# Patient Record
Sex: FEMALE
[De-identification: names, ages, dates, MRNs, and addresses within clinical notes are randomized per-mention and may not be internally consistent; named-entity substitution may affect disease eponyms.]

---

## 2019-04-16 ENCOUNTER — APPOINTMENT (OUTPATIENT)
Dept: PULMONOLOGY | Facility: CLINIC | Age: 59
End: 2019-04-16

## 2019-04-16 PROBLEM — Z00.00 ENCOUNTER FOR PREVENTIVE HEALTH EXAMINATION: Status: ACTIVE | Noted: 2019-04-16

## 2021-09-28 ENCOUNTER — APPOINTMENT (OUTPATIENT)
Dept: SURGERY | Facility: CLINIC | Age: 61
End: 2021-09-28
Payer: COMMERCIAL

## 2021-09-28 VITALS — BODY MASS INDEX: 31.37 KG/M2 | WEIGHT: 186 LBS | HEIGHT: 64.5 IN

## 2021-09-28 DIAGNOSIS — Z87.39 PERSONAL HISTORY OF OTHER DISEASES OF THE MUSCULOSKELETAL SYSTEM AND CONNECTIVE TISSUE: ICD-10-CM

## 2021-09-28 DIAGNOSIS — Z80.1 FAMILY HISTORY OF MALIGNANT NEOPLASM OF TRACHEA, BRONCHUS AND LUNG: ICD-10-CM

## 2021-09-28 DIAGNOSIS — Z78.9 OTHER SPECIFIED HEALTH STATUS: ICD-10-CM

## 2021-09-28 DIAGNOSIS — Z86.2 PERSONAL HISTORY OF DISEASES OF THE BLOOD AND BLOOD-FORMING ORGANS AND CERTAIN DISORDERS INVOLVING THE IMMUNE MECHANISM: ICD-10-CM

## 2021-09-28 DIAGNOSIS — Q38.3 OTHER CONGENITAL MALFORMATIONS OF TONGUE: ICD-10-CM

## 2021-09-28 DIAGNOSIS — Z78.0 ASYMPTOMATIC MENOPAUSAL STATE: ICD-10-CM

## 2021-09-28 DIAGNOSIS — E04.1 NONTOXIC SINGLE THYROID NODULE: ICD-10-CM

## 2021-09-28 PROCEDURE — 99204 OFFICE O/P NEW MOD 45 MIN: CPT

## 2021-09-28 PROCEDURE — 36415 COLL VENOUS BLD VENIPUNCTURE: CPT

## 2021-09-28 RX ORDER — OMEPRAZOLE 40 MG/1
40 CAPSULE, DELAYED RELEASE ORAL
Refills: 0 | Status: ACTIVE | COMMUNITY

## 2021-09-28 RX ORDER — HYDROXYCHLOROQUINE SULFATE 200 MG/1
TABLET ORAL
Refills: 0 | Status: ACTIVE | COMMUNITY

## 2021-09-29 PROBLEM — Z78.0 HISTORY OF MENOPAUSE: Status: RESOLVED | Noted: 2021-09-29 | Resolved: 2021-09-29

## 2021-09-29 PROBLEM — Z86.2 HISTORY OF ANEMIA: Status: RESOLVED | Noted: 2021-09-29 | Resolved: 2021-09-29

## 2021-09-29 LAB
24R-OH-CALCIDIOL SERPL-MCNC: 65 PG/ML
CALCIUM SERPL-MCNC: 9.7 MG/DL
CALCIUM SERPL-MCNC: 9.7 MG/DL
PARATHYROID HORMONE INTACT: 59 PG/ML
T3 SERPL-MCNC: 86 NG/DL
T4 FREE SERPL-MCNC: 1.3 NG/DL
THYROGLOB AB SERPL-ACNC: <20 IU/ML
THYROPEROXIDASE AB SERPL IA-ACNC: <10 IU/ML
TSH SERPL-ACNC: 1.3 UIU/ML

## 2021-09-29 NOTE — REASON FOR VISIT
[Initial Consultation] : an initial consultation for [FreeTextEntry2] : Thyroid nodule and tongue swelling [Other: _____] : [unfilled]

## 2021-09-29 NOTE — ASSESSMENT
[FreeTextEntry1] : will observe thyroid nodule. discussed that size of nodule is below the threshold for which fine needle aspiration cytology is generally recommended in the absence of any suspicious sonographic or clinical findings.  bloods drawn. to call next week for results. sonogram next visit. to return earlier if any change. patient has been given the opportunity to ask questions, and all of the patient's questions have been answered to their satisfaction. not certain of cause of tongue swelling.  to f/u with allergist

## 2021-09-29 NOTE — PHYSICAL EXAM
[de-identified] : no palpable thyroid nodules [Laryngoscopy Performed] : laryngoscopy was performed, see procedure section for findings [Midline] : located in midline position [Normal] : orientation to person, place, and time: normal [de-identified] : indirect  laryngoscopy shows normal vocal cord mobility bilaterally with no lesions noted

## 2021-09-29 NOTE — CONSULT LETTER
[Dear  ___] : Dear  [unfilled], [Consult Letter:] : I had the pleasure of evaluating your patient, [unfilled]. [Please see my note below.] : Please see my note below. [Consult Closing:] : Thank you very much for allowing me to participate in the care of this patient.  If you have any questions, please do not hesitate to contact me. [Sincerely,] : Sincerely, [FreeTextEntry2] : Dr. Luisito Luke [FreeTextEntry3] : Esteban Mcintyre MD, FACS\par System Director, Endocrine Surgery\par [facility]\par Assistant Clinical Professor of Surgery\par [facility] [employer] [facility]

## 2021-09-29 NOTE — HISTORY OF PRESENT ILLNESS
[de-identified] : Pt c/o 2 episodes of tongue swelling.  has been seen by allergist  and is only allergic to dust. Pt has thyroid nodule found on sonogram.  denies dysphagia, hoarseness, SOB or RT exposure. \par sonogram:  left thyroid nodule 8 mm\par I have reviewed all old and new data and available images.\par

## 2021-10-01 ENCOUNTER — NON-APPOINTMENT (OUTPATIENT)
Age: 61
End: 2021-10-01

## 2022-03-21 ENCOUNTER — NON-APPOINTMENT (OUTPATIENT)
Age: 62
End: 2022-03-21

## 2022-03-29 ENCOUNTER — APPOINTMENT (OUTPATIENT)
Dept: SURGERY | Facility: CLINIC | Age: 62
End: 2022-03-29

## 2022-04-06 ENCOUNTER — NON-APPOINTMENT (OUTPATIENT)
Age: 62
End: 2022-04-06

## 2022-10-25 ENCOUNTER — APPOINTMENT (OUTPATIENT)
Dept: UROLOGY | Facility: CLINIC | Age: 62
End: 2022-10-25

## 2022-10-25 VITALS
SYSTOLIC BLOOD PRESSURE: 150 MMHG | WEIGHT: 184 LBS | DIASTOLIC BLOOD PRESSURE: 90 MMHG | TEMPERATURE: 96.6 F | HEIGHT: 64 IN | BODY MASS INDEX: 31.41 KG/M2 | HEART RATE: 100 BPM

## 2022-10-25 DIAGNOSIS — N39.0 URINARY TRACT INFECTION, SITE NOT SPECIFIED: ICD-10-CM

## 2022-10-25 PROCEDURE — 99204 OFFICE O/P NEW MOD 45 MIN: CPT

## 2022-10-25 RX ORDER — MUPIROCIN 20 MG/G
2 OINTMENT TOPICAL
Qty: 22 | Refills: 0 | Status: ACTIVE | COMMUNITY
Start: 2022-10-21

## 2022-10-25 RX ORDER — OLOPATADINE HCL 1 MG/ML
0.1 SOLUTION/ DROPS OPHTHALMIC
Qty: 5 | Refills: 0 | Status: ACTIVE | COMMUNITY
Start: 2022-06-24

## 2022-10-25 RX ORDER — KETOROLAC TROMETHAMINE 5 MG/ML
0.5 SOLUTION OPHTHALMIC
Qty: 5 | Refills: 0 | Status: ACTIVE | COMMUNITY
Start: 2022-05-11

## 2022-10-25 RX ORDER — PREDNISOLONE ACETATE 10 MG/ML
1 SUSPENSION/ DROPS OPHTHALMIC
Qty: 5 | Refills: 0 | Status: ACTIVE | COMMUNITY
Start: 2022-05-11

## 2022-10-25 RX ORDER — CIPROFLOXACIN HYDROCHLORIDE 500 MG/1
500 TABLET, FILM COATED ORAL
Qty: 14 | Refills: 0 | Status: ACTIVE | COMMUNITY
Start: 2022-08-27

## 2022-10-25 RX ORDER — COVID-19 ANTIGEN TEST
KIT MISCELLANEOUS
Qty: 8 | Refills: 0 | Status: ACTIVE | COMMUNITY
Start: 2022-09-28

## 2022-10-25 RX ORDER — LOSARTAN POTASSIUM AND HYDROCHLOROTHIAZIDE 12.5; 1 MG/1; MG/1
100-12.5 TABLET ORAL
Qty: 30 | Refills: 0 | Status: ACTIVE | COMMUNITY
Start: 2022-09-09

## 2022-10-25 RX ORDER — SULFAMETHOXAZOLE AND TRIMETHOPRIM 800; 160 MG/1; MG/1
800-160 TABLET ORAL
Qty: 14 | Refills: 0 | Status: ACTIVE | COMMUNITY
Start: 2022-09-28

## 2022-10-25 RX ORDER — OFLOXACIN 3 MG/ML
0.3 SOLUTION/ DROPS OPHTHALMIC
Qty: 5 | Refills: 0 | Status: ACTIVE | COMMUNITY
Start: 2022-06-24

## 2022-10-25 RX ORDER — UPADACITINIB 15 MG/1
15 TABLET, EXTENDED RELEASE ORAL
Qty: 30 | Refills: 0 | Status: ACTIVE | COMMUNITY
Start: 2022-09-23

## 2022-10-25 RX ORDER — CEPHALEXIN 500 MG/1
500 CAPSULE ORAL
Qty: 20 | Refills: 0 | Status: ACTIVE | COMMUNITY
Start: 2022-10-21

## 2022-10-25 NOTE — HISTORY OF PRESENT ILLNESS
[None] : None [FreeTextEntry1] : Ms. Chowdary is a very pleasant 62 year old woman here today for recurrent UTIs.\par She reports that since this August she had a consistent UTI that did not resolve until 2 weeks ago.\par Reports that she had been ciprofloxacin, Keflex, and most recently Bactrim which she reports helped.\par Prior to this episode in August she only had one UTI and that was treated successfully with ciprofloxacin last year.\par Recent renal US from this month negative.\par Drinks 8 glasses of water daily.\par Denies any current hematuria, dysuria or urinary retention.\par Denies any personal or family history of kidney stones.\par Denies any family history of urological cancers.\par Reports family history of DM, but does not have it herself.\par Has a history of RA on Rinvoq.\par Former smoker, quit in 1988.\par \par \par

## 2022-10-25 NOTE — ASSESSMENT
[FreeTextEntry1] : Ms. Chowdary is a very pleasant 62 year old woman here today for recurrent UTIs.\par Has a history of RA on Rinvoq.\par Former smoker, quit in 1988.\par \par We discussed potential etiologies of recurrent urinary tract infections in postmenopausal women \par UC.\par UA.\par Renal and bladder ultrasound demonstrates no kidney stones no hydronephrosis\par Continue water intake.\par Start Uquora.\par Discussed risks and benefits of Uqora\par We discussed general preventative strategies for urinary tract infections\par RTO in 2 months.

## 2022-10-28 LAB
APPEARANCE: CLEAR
BACTERIA UR CULT: NORMAL
BACTERIA: ABNORMAL
BILIRUBIN URINE: NEGATIVE
BLOOD URINE: NEGATIVE
COLOR: YELLOW
GLUCOSE QUALITATIVE U: NEGATIVE
HYALINE CASTS: 0 /LPF
KETONES URINE: NEGATIVE
LEUKOCYTE ESTERASE URINE: NEGATIVE
MICROSCOPIC-UA: NORMAL
NITRITE URINE: NEGATIVE
PH URINE: 5.5
PROTEIN URINE: NEGATIVE
RED BLOOD CELLS URINE: 1 /HPF
SPECIFIC GRAVITY URINE: 1.02
SQUAMOUS EPITHELIAL CELLS: 0 /HPF
UROBILINOGEN URINE: NORMAL
WHITE BLOOD CELLS URINE: 0 /HPF

## 2023-01-03 ENCOUNTER — APPOINTMENT (OUTPATIENT)
Dept: UROLOGY | Facility: CLINIC | Age: 63
End: 2023-01-03

## 2024-08-21 ENCOUNTER — APPOINTMENT (OUTPATIENT)
Dept: UROLOGY | Facility: CLINIC | Age: 64
End: 2024-08-21
Payer: MEDICARE

## 2024-08-21 VITALS
SYSTOLIC BLOOD PRESSURE: 137 MMHG | TEMPERATURE: 96.8 F | DIASTOLIC BLOOD PRESSURE: 83 MMHG | HEART RATE: 92 BPM | WEIGHT: 184 LBS | HEIGHT: 64 IN | BODY MASS INDEX: 31.41 KG/M2 | OXYGEN SATURATION: 98 %

## 2024-08-21 DIAGNOSIS — Z78.9 OTHER SPECIFIED HEALTH STATUS: ICD-10-CM

## 2024-08-21 DIAGNOSIS — N39.0 URINARY TRACT INFECTION, SITE NOT SPECIFIED: ICD-10-CM

## 2024-08-21 PROCEDURE — G2211 COMPLEX E/M VISIT ADD ON: CPT

## 2024-08-21 PROCEDURE — 99203 OFFICE O/P NEW LOW 30 MIN: CPT

## 2024-08-21 RX ORDER — NIFEDIPINE 20 MG/1
CAPSULE ORAL
Refills: 0 | Status: ACTIVE | COMMUNITY

## 2024-08-21 NOTE — HISTORY OF PRESENT ILLNESS
[None] : None [FreeTextEntry1] : Ms. Chowdary is a very pleasant 64 year old woman here today for history of recurrent UTI. She reports that she has been okay since she was here last. Reports that she had not had a UTI with Uquora for 2 years. She reports that she stopped taking it and recently had a UTI. Treated with antibiotic and restarted Uquora a few days ago. Denies any hematuria or dysuria. Reports urgency and frequency but not so bothersome.

## 2024-08-21 NOTE — ASSESSMENT
[FreeTextEntry1] : Ms. Chowdary is a very pleasant 64 year old woman here today for history of recurrent UTI. She reports that she has been okay since she was here last. Reports that she had not had a UTI with Uquora for 2 years. She reports that she stopped taking it and recently had a UTI. Treated with antibiotic and restarted Uquora a few days ago. Denies any hematuria or dysuria. Reports urgency and frequency but not so bothersome. UC. UA. RTO in 6 months.  Patient is being seen today for evaluation and management of a chronic and longitudinal ongoing condition and I am of the primary treating physician

## 2024-08-22 LAB
APPEARANCE: CLEAR
BACTERIA: NEGATIVE /HPF
BILIRUBIN URINE: NEGATIVE
BLOOD URINE: NEGATIVE
CAST: 0 /LPF
COLOR: YELLOW
EPITHELIAL CELLS: 2 /HPF
GLUCOSE QUALITATIVE U: NEGATIVE MG/DL
KETONES URINE: NEGATIVE MG/DL
LEUKOCYTE ESTERASE URINE: NEGATIVE
MICROSCOPIC-UA: NORMAL
NITRITE URINE: NEGATIVE
PH URINE: 5.5
PROTEIN URINE: NEGATIVE MG/DL
RED BLOOD CELLS URINE: 3 /HPF
SPECIFIC GRAVITY URINE: 1.03
UROBILINOGEN URINE: 0.2 MG/DL
WHITE BLOOD CELLS URINE: 1 /HPF

## 2024-08-23 ENCOUNTER — NON-APPOINTMENT (OUTPATIENT)
Age: 64
End: 2024-08-23

## 2024-08-23 LAB — BACTERIA UR CULT: NORMAL

## 2024-10-03 ENCOUNTER — TRANSCRIPTION ENCOUNTER (OUTPATIENT)
Age: 64
End: 2024-10-03

## 2024-10-04 ENCOUNTER — INPATIENT (INPATIENT)
Facility: HOSPITAL | Age: 64
LOS: 1 days | Discharge: HOME | End: 2024-10-06
Payer: MEDICARE

## 2024-10-04 VITALS
TEMPERATURE: 98 F | SYSTOLIC BLOOD PRESSURE: 166 MMHG | HEIGHT: 64 IN | WEIGHT: 190.04 LBS | DIASTOLIC BLOOD PRESSURE: 86 MMHG | HEART RATE: 94 BPM | OXYGEN SATURATION: 98 % | RESPIRATION RATE: 18 BRPM

## 2024-10-04 DIAGNOSIS — K35.80 UNSPECIFIED ACUTE APPENDICITIS: ICD-10-CM

## 2024-10-04 LAB
ALBUMIN SERPL ELPH-MCNC: 4.3 G/DL — SIGNIFICANT CHANGE UP (ref 3.3–5)
ALP SERPL-CCNC: 70 U/L — SIGNIFICANT CHANGE UP (ref 40–120)
ALT FLD-CCNC: 16 U/L — SIGNIFICANT CHANGE UP (ref 4–33)
ANION GAP SERPL CALC-SCNC: 13 MMOL/L — SIGNIFICANT CHANGE UP (ref 7–14)
ANISOCYTOSIS BLD QL: SLIGHT — SIGNIFICANT CHANGE UP
APPEARANCE UR: CLEAR — SIGNIFICANT CHANGE UP
APTT BLD: 32.8 SEC — SIGNIFICANT CHANGE UP (ref 24.5–35.6)
AST SERPL-CCNC: 15 U/L — SIGNIFICANT CHANGE UP (ref 4–32)
BACTERIA # UR AUTO: NEGATIVE /HPF — SIGNIFICANT CHANGE UP
BASOPHILS # BLD AUTO: 0 K/UL — SIGNIFICANT CHANGE UP (ref 0–0.2)
BASOPHILS NFR BLD AUTO: 0 % — SIGNIFICANT CHANGE UP (ref 0–2)
BILIRUB SERPL-MCNC: 0.6 MG/DL — SIGNIFICANT CHANGE UP (ref 0.2–1.2)
BILIRUB UR-MCNC: NEGATIVE — SIGNIFICANT CHANGE UP
BLD GP AB SCN SERPL QL: NEGATIVE — SIGNIFICANT CHANGE UP
BUN SERPL-MCNC: 11 MG/DL — SIGNIFICANT CHANGE UP (ref 7–23)
CALCIUM SERPL-MCNC: 9.4 MG/DL — SIGNIFICANT CHANGE UP (ref 8.4–10.5)
CAST: 0 /LPF — SIGNIFICANT CHANGE UP (ref 0–4)
CHLORIDE SERPL-SCNC: 98 MMOL/L — SIGNIFICANT CHANGE UP (ref 98–107)
CO2 SERPL-SCNC: 25 MMOL/L — SIGNIFICANT CHANGE UP (ref 22–31)
COLOR SPEC: YELLOW — SIGNIFICANT CHANGE UP
CREAT SERPL-MCNC: 0.76 MG/DL — SIGNIFICANT CHANGE UP (ref 0.5–1.3)
DACRYOCYTES BLD QL SMEAR: SLIGHT — SIGNIFICANT CHANGE UP
DIFF PNL FLD: NEGATIVE — SIGNIFICANT CHANGE UP
EGFR: 87 ML/MIN/1.73M2 — SIGNIFICANT CHANGE UP
ELLIPTOCYTES BLD QL SMEAR: SLIGHT — SIGNIFICANT CHANGE UP
EOSINOPHIL # BLD AUTO: 0.21 K/UL — SIGNIFICANT CHANGE UP (ref 0–0.5)
EOSINOPHIL NFR BLD AUTO: 2.6 % — SIGNIFICANT CHANGE UP (ref 0–6)
GIANT PLATELETS BLD QL SMEAR: PRESENT — SIGNIFICANT CHANGE UP
GLUCOSE SERPL-MCNC: 99 MG/DL — SIGNIFICANT CHANGE UP (ref 70–99)
GLUCOSE UR QL: NEGATIVE MG/DL — SIGNIFICANT CHANGE UP
HCT VFR BLD CALC: 35.2 % — SIGNIFICANT CHANGE UP (ref 34.5–45)
HGB BLD-MCNC: 11.1 G/DL — LOW (ref 11.5–15.5)
HYPOCHROMIA BLD QL: SIGNIFICANT CHANGE UP
IANC: 5.72 K/UL — SIGNIFICANT CHANGE UP (ref 1.8–7.4)
INR BLD: 1 RATIO — SIGNIFICANT CHANGE UP (ref 0.85–1.16)
KETONES UR-MCNC: NEGATIVE MG/DL — SIGNIFICANT CHANGE UP
LEUKOCYTE ESTERASE UR-ACNC: ABNORMAL
LIDOCAIN IGE QN: 15 U/L — SIGNIFICANT CHANGE UP (ref 7–60)
LYMPHOCYTES # BLD AUTO: 1.35 K/UL — SIGNIFICANT CHANGE UP (ref 1–3.3)
LYMPHOCYTES # BLD AUTO: 16.5 % — SIGNIFICANT CHANGE UP (ref 13–44)
MANUAL SMEAR VERIFICATION: SIGNIFICANT CHANGE UP
MCHC RBC-ENTMCNC: 18.4 PG — LOW (ref 27–34)
MCHC RBC-ENTMCNC: 31.5 GM/DL — LOW (ref 32–36)
MCV RBC AUTO: 58.5 FL — LOW (ref 80–100)
MICROCYTES BLD QL: SIGNIFICANT CHANGE UP
MONOCYTES # BLD AUTO: 0.29 K/UL — SIGNIFICANT CHANGE UP (ref 0–0.9)
MONOCYTES NFR BLD AUTO: 3.5 % — SIGNIFICANT CHANGE UP (ref 2–14)
NEUTROPHILS # BLD AUTO: 5.55 K/UL — SIGNIFICANT CHANGE UP (ref 1.8–7.4)
NEUTROPHILS NFR BLD AUTO: 66.9 % — SIGNIFICANT CHANGE UP (ref 43–77)
NEUTS BAND # BLD: 0.9 % — SIGNIFICANT CHANGE UP (ref 0–6)
NITRITE UR-MCNC: NEGATIVE — SIGNIFICANT CHANGE UP
OVALOCYTES BLD QL SMEAR: SIGNIFICANT CHANGE UP
PH UR: 6.5 — SIGNIFICANT CHANGE UP (ref 5–8)
PLAT MORPH BLD: NORMAL — SIGNIFICANT CHANGE UP
PLATELET # BLD AUTO: 258 K/UL — SIGNIFICANT CHANGE UP (ref 150–400)
PLATELET COUNT - ESTIMATE: NORMAL — SIGNIFICANT CHANGE UP
POIKILOCYTOSIS BLD QL AUTO: SIGNIFICANT CHANGE UP
POTASSIUM SERPL-MCNC: 3.7 MMOL/L — SIGNIFICANT CHANGE UP (ref 3.5–5.3)
POTASSIUM SERPL-SCNC: 3.7 MMOL/L — SIGNIFICANT CHANGE UP (ref 3.5–5.3)
PROT SERPL-MCNC: 7.3 G/DL — SIGNIFICANT CHANGE UP (ref 6–8.3)
PROT UR-MCNC: SIGNIFICANT CHANGE UP MG/DL
PROTHROM AB SERPL-ACNC: 11.6 SEC — SIGNIFICANT CHANGE UP (ref 9.9–13.4)
RBC # BLD: 6.02 M/UL — HIGH (ref 3.8–5.2)
RBC # FLD: 17 % — HIGH (ref 10.3–14.5)
RBC BLD AUTO: ABNORMAL
RBC CASTS # UR COMP ASSIST: 1 /HPF — SIGNIFICANT CHANGE UP (ref 0–4)
RH IG SCN BLD-IMP: POSITIVE — SIGNIFICANT CHANGE UP
SCHISTOCYTES BLD QL AUTO: SLIGHT — SIGNIFICANT CHANGE UP
SMUDGE CELLS # BLD: PRESENT — SIGNIFICANT CHANGE UP
SODIUM SERPL-SCNC: 136 MMOL/L — SIGNIFICANT CHANGE UP (ref 135–145)
SP GR SPEC: 1.07 — HIGH (ref 1–1.03)
SQUAMOUS # UR AUTO: 3 /HPF — SIGNIFICANT CHANGE UP (ref 0–5)
TARGETS BLD QL SMEAR: SLIGHT — SIGNIFICANT CHANGE UP
UROBILINOGEN FLD QL: 0.2 MG/DL — SIGNIFICANT CHANGE UP (ref 0.2–1)
VARIANT LYMPHS # BLD: 9.6 % — HIGH (ref 0–6)
WBC # BLD: 8.18 K/UL — SIGNIFICANT CHANGE UP (ref 3.8–10.5)
WBC # FLD AUTO: 8.18 K/UL — SIGNIFICANT CHANGE UP (ref 3.8–10.5)
WBC UR QL: 8 /HPF — HIGH (ref 0–5)

## 2024-10-04 PROCEDURE — 99285 EMERGENCY DEPT VISIT HI MDM: CPT

## 2024-10-04 PROCEDURE — 71045 X-RAY EXAM CHEST 1 VIEW: CPT | Mod: 26

## 2024-10-04 DEVICE — STAPLER COVIDIEN TRI-STAPLE 45MM PURPLE RELOAD: Type: IMPLANTABLE DEVICE | Status: FUNCTIONAL

## 2024-10-04 RX ORDER — CLINDAMYCIN PHOSPHATE 150 MG/ML
600 INJECTION, SOLUTION INTRAVENOUS ONCE
Refills: 0 | Status: COMPLETED | OUTPATIENT
Start: 2024-10-04 | End: 2024-10-04

## 2024-10-04 RX ORDER — FENTANYL CITRATE-0.9 % NACL/PF 300MCG/30
50 PATIENT CONTROLLED ANALGESIA VIAL INJECTION
Refills: 0 | Status: DISCONTINUED | OUTPATIENT
Start: 2024-10-04 | End: 2024-10-04

## 2024-10-04 RX ORDER — HYDROMORPHONE HYDROCHLORIDE 1 MG/ML
0.5 INJECTION, SOLUTION INTRAMUSCULAR; INTRAVENOUS; SUBCUTANEOUS
Refills: 0 | Status: DISCONTINUED | OUTPATIENT
Start: 2024-10-04 | End: 2024-10-04

## 2024-10-04 RX ORDER — MORPHINE SULFATE 30 MG/1
2 TABLET, FILM COATED, EXTENDED RELEASE ORAL ONCE
Refills: 0 | Status: DISCONTINUED | OUTPATIENT
Start: 2024-10-04 | End: 2024-10-04

## 2024-10-04 RX ORDER — HYDROXYCHLOROQUINE SULFATE 200 MG/1
0 TABLET, FILM COATED ORAL
Refills: 0 | DISCHARGE

## 2024-10-04 RX ORDER — ANTACID TABLETS 500 MG/1
1 TABLET, CHEWABLE ORAL ONCE
Refills: 0 | Status: COMPLETED | OUTPATIENT
Start: 2024-10-04 | End: 2024-10-04

## 2024-10-04 RX ORDER — SODIUM CHLORIDE IRRIG SOLUTION 0.9 %
1000 SOLUTION, IRRIGATION IRRIGATION
Refills: 0 | Status: DISCONTINUED | OUTPATIENT
Start: 2024-10-04 | End: 2024-10-05

## 2024-10-04 RX ORDER — CLINDAMYCIN PHOSPHATE 150 MG/ML
600 INJECTION, SOLUTION INTRAVENOUS EVERY 8 HOURS
Refills: 0 | Status: DISCONTINUED | OUTPATIENT
Start: 2024-10-05 | End: 2024-10-05

## 2024-10-04 RX ORDER — SODIUM CHLORIDE 0.9 % (FLUSH) 0.9 %
1000 SYRINGE (ML) INJECTION ONCE
Refills: 0 | Status: COMPLETED | OUTPATIENT
Start: 2024-10-04 | End: 2024-10-04

## 2024-10-04 RX ORDER — CLINDAMYCIN PHOSPHATE 150 MG/ML
INJECTION, SOLUTION INTRAVENOUS
Refills: 0 | Status: DISCONTINUED | OUTPATIENT
Start: 2024-10-04 | End: 2024-10-05

## 2024-10-04 RX ORDER — ACETAMINOPHEN 325 MG
1000 TABLET ORAL EVERY 6 HOURS
Refills: 0 | Status: COMPLETED | OUTPATIENT
Start: 2024-10-04 | End: 2024-10-05

## 2024-10-04 RX ORDER — OXYCODONE HYDROCHLORIDE 30 MG/1
5 TABLET, FILM COATED, EXTENDED RELEASE ORAL ONCE
Refills: 0 | Status: DISCONTINUED | OUTPATIENT
Start: 2024-10-04 | End: 2024-10-04

## 2024-10-04 RX ORDER — LOSARTAN POTASSIUM 100 MG/1
1 TABLET, FILM COATED ORAL
Refills: 0 | DISCHARGE

## 2024-10-04 RX ORDER — DIPHENHYDRAMINE HCL 12.5MG/5ML
50 LIQUID (ML) ORAL ONCE
Refills: 0 | Status: DISCONTINUED | OUTPATIENT
Start: 2024-10-04 | End: 2024-10-06

## 2024-10-04 RX ORDER — ONDANSETRON HCL/PF 4 MG/2 ML
4 VIAL (ML) INJECTION ONCE
Refills: 0 | Status: COMPLETED | OUTPATIENT
Start: 2024-10-04 | End: 2024-10-04

## 2024-10-04 RX ADMIN — ANTACID TABLETS 1 TABLET(S): 500 TABLET, CHEWABLE ORAL at 21:50

## 2024-10-04 RX ADMIN — Medication 400 MILLIGRAM(S): at 23:42

## 2024-10-04 RX ADMIN — Medication 120 MILLILITER(S): at 18:57

## 2024-10-04 RX ADMIN — Medication 1000 MILLILITER(S): at 13:43

## 2024-10-04 RX ADMIN — HYDROMORPHONE HYDROCHLORIDE 0.5 MILLIGRAM(S): 1 INJECTION, SOLUTION INTRAMUSCULAR; INTRAVENOUS; SUBCUTANEOUS at 19:15

## 2024-10-04 RX ADMIN — CLINDAMYCIN PHOSPHATE 100 MILLIGRAM(S): 150 INJECTION, SOLUTION INTRAVENOUS at 19:44

## 2024-10-04 RX ADMIN — Medication 200 MILLIGRAM(S): at 12:40

## 2024-10-04 RX ADMIN — Medication 120 MILLILITER(S): at 21:53

## 2024-10-04 RX ADMIN — HYDROMORPHONE HYDROCHLORIDE 0.5 MILLIGRAM(S): 1 INJECTION, SOLUTION INTRAMUSCULAR; INTRAVENOUS; SUBCUTANEOUS at 18:11

## 2024-10-04 RX ADMIN — Medication 100 MILLIGRAM(S): at 14:25

## 2024-10-04 RX ADMIN — HYDROMORPHONE HYDROCHLORIDE 0.5 MILLIGRAM(S): 1 INJECTION, SOLUTION INTRAMUSCULAR; INTRAVENOUS; SUBCUTANEOUS at 19:00

## 2024-10-04 RX ADMIN — Medication 5000 UNIT(S): at 14:26

## 2024-10-04 RX ADMIN — Medication 4 MILLIGRAM(S): at 19:40

## 2024-10-04 RX ADMIN — MORPHINE SULFATE 2 MILLIGRAM(S): 30 TABLET, FILM COATED, EXTENDED RELEASE ORAL at 12:40

## 2024-10-04 RX ADMIN — HYDROMORPHONE HYDROCHLORIDE 0.5 MILLIGRAM(S): 1 INJECTION, SOLUTION INTRAMUSCULAR; INTRAVENOUS; SUBCUTANEOUS at 18:59

## 2024-10-04 RX ADMIN — Medication 5000 UNIT(S): at 21:48

## 2024-10-04 NOTE — ED PROVIDER NOTE - ATTENDING CONTRIBUTION TO CARE
64F complaining of abdominal pain.  Patient with gradually worsening mid abdominal pain and now complaining of worsened low abdominal pain to the right side.  Went to outpatient imaging and arrives with CT showing acute appendicitis.  Had no complaints of recent illness, no fever/chills, denies N/V, no sick contacts.  MMM, clear lungs, heart reg, abd soft, tender RLQ to palp, no gabrielle/ mild guarding, no CVAT, no edema, NT calves.

## 2024-10-04 NOTE — H&P ADULT - NSHPPHYSICALEXAM_GEN_ALL_CORE
General Appearance: no acute distress, NTND   Chest: airway intact, non-labored breathing  CV: no JVD, palpable pulses b/l  Abdomen: soft, focally TTP in RLQ, nondistended  Extremities: WWP

## 2024-10-04 NOTE — ED PROVIDER NOTE - CLINICAL SUMMARY MEDICAL DECISION MAKING FREE TEXT BOX
65 yo f hx HTN p/w appendicitis. had lower abdominal pain starting umbilicus radiating to RLQ went to PMD and got CT imaging this am, found to have appendicitis sent to ED. denies any fevers, chills, n/v, dysuria. on exam patient exquisitely tender RLQ w rebound, will upload CT imaging and consult surgery.

## 2024-10-04 NOTE — ED ADULT NURSE REASSESSMENT NOTE - NS ED NURSE REASSESS COMMENT FT1
Verbal report given to Fantasma from OR , pt undressed placed in the hospital gown , belongings given to  - awaiting transport

## 2024-10-04 NOTE — PRE-OP CHECKLIST - COMMENTS
took Protonix at 6am with sip of water and contrast at 6am took Protonix at 6am with sip of water and contrast at 8am

## 2024-10-04 NOTE — H&P ADULT - HISTORY OF PRESENT ILLNESS
64F PMHx RA (hydroxycholorquinolone), myomectomy (firborids), HTN presenting after OP CT scan this am showed c/f appendicitis. Patient reports pain started over the weekend as gas-like pain. It was mainly periumbilical but over the next few days migrated to RLQ. Patient denies subjective fever/chills, CP, SOB, or n/v. Pain currently  well controlled. Last colonoscopy was in March which she reports was normal.    In the ED AVSS and labs unremarkable. CT images from OP scan in process of being uploaded.

## 2024-10-04 NOTE — CHART NOTE - NSCHARTNOTEFT_GEN_A_CORE
SURGERY POST OP CHECK    STATUS POST PROCEDURE: Lap appendectomy    SUBJECTIVE: Pt seen and examined without complaints. Pain is controlled. Denies CP/SOB/N/V.       OBJECTIVE:  Vital Signs Last 24 Hrs  T(C): 36.7 (04 Oct 2024 20:40), Max: 36.8 (04 Oct 2024 11:04)  T(F): 98 (04 Oct 2024 20:40), Max: 98.3 (04 Oct 2024 11:04)  HR: 89 (04 Oct 2024 20:40) (73 - 95)  BP: 146/71 (04 Oct 2024 20:40) (99/87 - 166/86)  BP(mean): 78 (04 Oct 2024 20:00) (75 - 92)  RR: 18 (04 Oct 2024 20:40) (13 - 18)  SpO2: 99% (04 Oct 2024 20:40) (94% - 100%)    Parameters below as of 04 Oct 2024 20:40  Patient On (Oxygen Delivery Method): room air      I&O's Summary    04 Oct 2024 07:01  -  04 Oct 2024 22:49  --------------------------------------------------------  IN: 340 mL / OUT: 275 mL / NET: 65 mL        PHYSICAL EXAM:  Gen: NAD, A&Ox3  Pulm: No respiratory distress, no subcostal retractions  CV: RRR, no JVD  Abd: Soft, NT, ND, port sites c/d/i, drain site with gauze and tegaderm c/d/i  Drains: HUGO x 1 SS holding suction  Extremities: FROM, warm and well perfused, equal bilateral muscle strength    ASSESSMENT/PLAN: HPI:  64F PMHx RA (hydroxycholorquinolone), myomectomy (firborids), HTN presenting after OP CT scan this am showed c/f appendicitis. Patient reports pain started over the weekend as gas-like pain. It was mainly periumbilical but over the next few days migrated to RLQ. Patient denies subjective fever/chills, CP, SOB, or n/v. Pain currently  well controlled. Last colonoscopy was in March which she reports was normal.  In the ED AVSS and labs unremarkable. CT images from OP scan in process of being uploaded.   . Pt is s/p ; POD #0 Lap appendectomy. Appearing well overall. HDS.     Plan:  - Diet   - Monitor bowel function   - Antibiotics  - Analgesia and antiemetics as needed  - Strict I&O's  - Monitor HUGO drain output- HUGO drain teaching  - OOB as tolerated  - Incentive spirometry  - DVT prophylaxis: SCD  - Monitor overnight  - monitor abdominal exam  - monitor UOP

## 2024-10-04 NOTE — ED ADULT NURSE NOTE - OBJECTIVE STATEMENT
sent in for appendicitis as found on CT studies today. pt c/o LRQ abd pain since last week, denies fever, chills, reports was recently treated for UTI. Pt is visibly uncomfortable in triage.- as per Triage note, pt states the same . Pt seen and eval by provider ,IV established , blood work drawn and esnd it to the lab . Awaiting surgery to come

## 2024-10-04 NOTE — ED PROVIDER NOTE - PHYSICAL EXAMINATION
GENERAL: well appearing in no acute distress, non-toxic appearing  CARDIAC: regular rate and rhythm, normal S1S2, no appreciable murmurs, 2+ pulses in UE/LE b/l  PULM: normal breath sounds, clear to ascultation bilaterally, no rales, rhonchi, wheezing  GI: abdomen nondistended, soft, tender RLQ w rebound   : no CVA tenderness b/l, no suprapubic tenderness  SKIN: well-perfused, extremities warm, no visible rashes  PSYCH: appropriate mood and affect

## 2024-10-04 NOTE — H&P ADULT - ATTENDING COMMENTS
Acute appendicitis on outpatient CT  Several days of abdominal pain  Abd soft, RLQ and suprapubic tenderness  For OR today

## 2024-10-04 NOTE — ED ADULT TRIAGE NOTE - CHIEF COMPLAINT QUOTE
sent in for appendicitis as found on CT studies today. pt c/o LRQ abd pain since last week, denies fever, chills, reports was recently treated for UTI. Pt is visibly uncomfortable in triage.

## 2024-10-04 NOTE — ASU PATIENT PROFILE, ADULT - NS TRANSFER EYEGLASSES PAIRS

## 2024-10-04 NOTE — ED ADULT NURSE REASSESSMENT NOTE - NS ED NURSE REASSESS COMMENT FT1
This nurse was called to the bedside, pt states she  feels she  been having allergic reaction , currently patient receiving Cipro IV - medication stopped . MD called ot the bedisde for eval .Pt has been having R eye puffiness and redness, neck noted to be having some redness. Pt states she has been coughing since started antibiotic - med stopped untill further orders

## 2024-10-04 NOTE — H&P ADULT - ASSESSMENT
64F PMHx RA (hydroxycholorquinolone), myomectomy (firborids), HTN presenting after OP CT scan this am showed c/f appendicitis.    Plan  - NPO/IVF  - added-on and consented for lap appy (Dr. Fiore)  - IV abx  - IV pain meds  - DVT PPX  - F/u CT scans now uploaded  Discussed with Dr. Estefanía BOND Team [id number]  Mello Yousif MD (PGY2)

## 2024-10-04 NOTE — PATIENT PROFILE ADULT - PATIENT'S GENDER IDENTITY
MD Smith:  The scribe's documentation has been prepared under my direction and personally reviewed by me in its entirety. I confirm that the note above accurately reflects all work, treatment, procedures, and medical decision making performed by me.  MD Smith:  see the MDM & progress notes for my I&P. Female

## 2024-10-05 ENCOUNTER — TRANSCRIPTION ENCOUNTER (OUTPATIENT)
Age: 64
End: 2024-10-05

## 2024-10-05 PROCEDURE — 88304 TISSUE EXAM BY PATHOLOGIST: CPT | Mod: 26

## 2024-10-05 RX ORDER — ACETAMINOPHEN 325 MG
975 TABLET ORAL EVERY 6 HOURS
Refills: 0 | Status: DISCONTINUED | OUTPATIENT
Start: 2024-10-05 | End: 2024-10-06

## 2024-10-05 RX ORDER — OXYCODONE HYDROCHLORIDE 30 MG/1
1 TABLET, FILM COATED, EXTENDED RELEASE ORAL
Qty: 18 | Refills: 0
Start: 2024-10-05 | End: 2024-10-07

## 2024-10-05 RX ORDER — OXYCODONE HYDROCHLORIDE 30 MG/1
5 TABLET, FILM COATED, EXTENDED RELEASE ORAL EVERY 4 HOURS
Refills: 0 | Status: DISCONTINUED | OUTPATIENT
Start: 2024-10-05 | End: 2024-10-06

## 2024-10-05 RX ORDER — OXYCODONE HYDROCHLORIDE 30 MG/1
2.5 TABLET, FILM COATED, EXTENDED RELEASE ORAL EVERY 4 HOURS
Refills: 0 | Status: DISCONTINUED | OUTPATIENT
Start: 2024-10-05 | End: 2024-10-06

## 2024-10-05 RX ADMIN — Medication 400 MILLIGRAM(S): at 11:21

## 2024-10-05 RX ADMIN — OXYCODONE HYDROCHLORIDE 5 MILLIGRAM(S): 30 TABLET, FILM COATED, EXTENDED RELEASE ORAL at 22:47

## 2024-10-05 RX ADMIN — OXYCODONE HYDROCHLORIDE 5 MILLIGRAM(S): 30 TABLET, FILM COATED, EXTENDED RELEASE ORAL at 15:38

## 2024-10-05 RX ADMIN — Medication 400 MILLIGRAM(S): at 06:24

## 2024-10-05 RX ADMIN — CLINDAMYCIN PHOSPHATE 100 MILLIGRAM(S): 150 INJECTION, SOLUTION INTRAVENOUS at 13:54

## 2024-10-05 RX ADMIN — OXYCODONE HYDROCHLORIDE 5 MILLIGRAM(S): 30 TABLET, FILM COATED, EXTENDED RELEASE ORAL at 23:47

## 2024-10-05 RX ADMIN — Medication 500 MILLIGRAM(S): at 21:20

## 2024-10-05 RX ADMIN — Medication 80 MILLIGRAM(S): at 09:46

## 2024-10-05 RX ADMIN — Medication 100 MILLIGRAM(S): at 01:21

## 2024-10-05 RX ADMIN — Medication 5000 UNIT(S): at 13:54

## 2024-10-05 RX ADMIN — Medication 1000 MILLIGRAM(S): at 11:51

## 2024-10-05 RX ADMIN — Medication 5000 UNIT(S): at 21:20

## 2024-10-05 RX ADMIN — OXYCODONE HYDROCHLORIDE 5 MILLIGRAM(S): 30 TABLET, FILM COATED, EXTENDED RELEASE ORAL at 16:08

## 2024-10-05 RX ADMIN — Medication 975 MILLIGRAM(S): at 17:45

## 2024-10-05 RX ADMIN — Medication 975 MILLIGRAM(S): at 23:43

## 2024-10-05 RX ADMIN — Medication 975 MILLIGRAM(S): at 18:15

## 2024-10-05 RX ADMIN — CLINDAMYCIN PHOSPHATE 100 MILLIGRAM(S): 150 INJECTION, SOLUTION INTRAVENOUS at 06:28

## 2024-10-05 RX ADMIN — Medication 5000 UNIT(S): at 06:28

## 2024-10-05 RX ADMIN — Medication 80 MILLIGRAM(S): at 20:49

## 2024-10-05 RX ADMIN — Medication 100 MILLIGRAM(S): at 14:25

## 2024-10-05 NOTE — DISCHARGE NOTE PROVIDER - NSDCFUADDAPPT_GEN_ALL_CORE_FT
PLEASE CALL THE OFFICE ON MONDAY TO SCHEDULE THE FOLLOW UP APPOINTMENT EITHER TUESDAY OR THURSDAY DEPENDING ON THE DRAIN OUTPUT.

## 2024-10-05 NOTE — PROGRESS NOTE ADULT - SUBJECTIVE AND OBJECTIVE BOX
DATE OF SERVICE: 10-05-24 @ 12:09    INTERVAL HPI/OVERNIGHT EVENTS:  Doing well post op, voiding. tolerating cld so far    MEDICATIONS  (STANDING):  clindamycin IVPB 600 milliGRAM(s) IV Intermittent every 8 hours  clindamycin IVPB      diphenhydrAMINE Injectable 50 milliGRAM(s) IV Push once  heparin   Injectable 5000 Unit(s) SubCutaneous every 8 hours  metroNIDAZOLE  IVPB 500 milliGRAM(s) IV Intermittent every 12 hours    MEDICATIONS  (PRN):      Vital Signs Last 24 Hrs  T(C): 36.8 (05 Oct 2024 10:05), Max: 36.8 (04 Oct 2024 14:09)  T(F): 98.2 (05 Oct 2024 10:05), Max: 98.3 (04 Oct 2024 14:09)  HR: 82 (05 Oct 2024 10:05) (73 - 95)  BP: 145/78 (05 Oct 2024 10:05) (99/87 - 152/77)  BP(mean): 78 (04 Oct 2024 20:00) (75 - 92)  RR: 17 (05 Oct 2024 10:05) (13 - 20)  SpO2: 98% (05 Oct 2024 10:05) (94% - 100%)    Parameters below as of 05 Oct 2024 10:05  Patient On (Oxygen Delivery Method): room air      I&O's Detail    04 Oct 2024 07:01  -  05 Oct 2024 07:00  --------------------------------------------------------  IN:    Lactated Ringers: 840 mL    Oral Fluid: 220 mL  Total IN: 1060 mL    OUT:    Bulb (mL): 55 mL    Voided (mL): 1000 mL  Total OUT: 1055 mL    Total NET: 5 mL      05 Oct 2024 07:01  -  05 Oct 2024 12:09  --------------------------------------------------------  IN:  Total IN: 0 mL    OUT:    Bulb (mL): 70 mL    Voided (mL): 750 mL  Total OUT: 820 mL    Total NET: -820 mL            Physical Exam:  General: WN/WD NAD  Respiratory: airway patent, respirations unlabored, no increased WoB  Neurology: A&Ox3, nonfocal, BUSTOS x 4  Abdominal: soft, incisions c/d/i, appropriate ttp, HUGO SS      LABS:                        11.1   8.18  )-----------( 258      ( 04 Oct 2024 12:46 )             35.2     10    136  |  98  |  11  ----------------------------<  99  3.7   |  25  |  0.76    Ca    9.4      04 Oct 2024 12:46    TPro  7.3  /  Alb  4.3  /  TBili  0.6  /  DBili  x   /  AST  15  /  ALT  16  /  AlkPhos  70  10-04    PT/INR - ( 04 Oct 2024 12:46 )   PT: 11.6 sec;   INR: 1.00 ratio         PTT - ( 04 Oct 2024 12:46 )  PTT:32.8 sec  Urinalysis Basic - ( 04 Oct 2024 12:46 )    Color: Yellow / Appearance: Clear / S.070 / pH: x  Gluc: 99 mg/dL / Ketone: Negative mg/dL  / Bili: Negative / Urobili: 0.2 mg/dL   Blood: x / Protein: Trace mg/dL / Nitrite: Negative   Leuk Esterase: Trace / RBC: 1 /HPF / WBC 8 /HPF   Sq Epi: x / Non Sq Epi: 3 /HPF / Bacteria: Negative /HPF        RADIOLOGY & ADDITIONAL STUDIES: DATE OF SERVICE: 10-05-24 @ 12:09    INTERVAL HPI/OVERNIGHT EVENTS:  Doing well post op, voiding. tolerating cld so far    MEDICATIONS  (STANDING):  clindamycin IVPB 600 milliGRAM(s) IV Intermittent every 8 hours  clindamycin IVPB      diphenhydrAMINE Injectable 50 milliGRAM(s) IV Push once  heparin   Injectable 5000 Unit(s) SubCutaneous every 8 hours  metroNIDAZOLE  IVPB 500 milliGRAM(s) IV Intermittent every 12 hours    MEDICATIONS  (PRN):      Vital Signs Last 24 Hrs  T(C): 36.8 (05 Oct 2024 10:05), Max: 36.8 (04 Oct 2024 14:09)  T(F): 98.2 (05 Oct 2024 10:05), Max: 98.3 (04 Oct 2024 14:09)  HR: 82 (05 Oct 2024 10:05) (73 - 95)  BP: 145/78 (05 Oct 2024 10:05) (99/87 - 152/77)  BP(mean): 78 (04 Oct 2024 20:00) (75 - 92)  RR: 17 (05 Oct 2024 10:05) (13 - 20)  SpO2: 98% (05 Oct 2024 10:05) (94% - 100%)    Parameters below as of 05 Oct 2024 10:05  Patient On (Oxygen Delivery Method): room air      I&O's Detail    04 Oct 2024 07:01  -  05 Oct 2024 07:00  --------------------------------------------------------  IN:    Lactated Ringers: 840 mL    Oral Fluid: 220 mL  Total IN: 1060 mL    OUT:    Bulb (mL): 55 mL    Voided (mL): 1000 mL  Total OUT: 1055 mL    Total NET: 5 mL      05 Oct 2024 07:01  -  05 Oct 2024 12:09  --------------------------------------------------------  IN:  Total IN: 0 mL    OUT:    Bulb (mL): 70 mL    Voided (mL): 750 mL  Total OUT: 820 mL    Total NET: -820 mL            Physical Exam:  General: WN/WD NAD  Respiratory: airway patent, respirations unlabored, no increased WoB  Neurology: A&Ox3, nonfocal, BUSTOS x 4  Abdominal: Abd: Soft, NT, ND, port sites c/d/i, drain site with gauze and tegaderm c/d/i  Drains: HUGO x 1 SS holding suction      LABS:                        11.1   8.18  )-----------( 258      ( 04 Oct 2024 12:46 )             35.2     10    136  |  98  |  11  ----------------------------<  99  3.7   |  25  |  0.76    Ca    9.4      04 Oct 2024 12:46    TPro  7.3  /  Alb  4.3  /  TBili  0.6  /  DBili  x   /  AST  15  /  ALT  16  /  AlkPhos  70  10-04    PT/INR - ( 04 Oct 2024 12:46 )   PT: 11.6 sec;   INR: 1.00 ratio         PTT - ( 04 Oct 2024 12:46 )  PTT:32.8 sec  Urinalysis Basic - ( 04 Oct 2024 12:46 )    Color: Yellow / Appearance: Clear / S.070 / pH: x  Gluc: 99 mg/dL / Ketone: Negative mg/dL  / Bili: Negative / Urobili: 0.2 mg/dL   Blood: x / Protein: Trace mg/dL / Nitrite: Negative   Leuk Esterase: Trace / RBC: 1 /HPF / WBC 8 /HPF   Sq Epi: x / Non Sq Epi: 3 /HPF / Bacteria: Negative /HPF        RADIOLOGY & ADDITIONAL STUDIES:

## 2024-10-05 NOTE — DISCHARGE NOTE PROVIDER - CARE PROVIDER_API CALL
Stephanie Fiore  Surgery  [address] [place] [address]  Phone: [phone]  Fax: [phone]  Follow Up Time: 1 week

## 2024-10-05 NOTE — DISCHARGE NOTE PROVIDER - NSDCFUADDINST_GEN_ALL_CORE_FT
PAIN MEDICATION: Please take tylenol every 6 hours, and stagger with ibuprofen every 6 hours. This will allow you to alternate medications for more consistent pain control. For example: take a dose of tylenol at 8 am, then take a dose of ibuprofen at 11 am, then take a dose of tylenol at 2pm, and continue as needed. You may take Tylenol for pain. Use as directed. The maximum dose of Tylenol for 24 hours is 4000 mg. Please do not exceed that amount. A prescription for oxycodone was sent to the pharmacy to be used in cases of severe pain.     A prescription for oxycodone was sent to the pharmacy. It is to be used for severe pain that is not controlled with Tylenol or Ibuprofen. Please use as directed.

## 2024-10-05 NOTE — CONSULT NOTE ADULT - SUBJECTIVE AND OBJECTIVE BOX
CHIEF COMPLAINT: appendicitis    HISTORY OF PRESENT ILLNESS:  64F PMHx RA (hydroxycholorquinolone), myomectomy (firborids), HTN presenting after OP CT scan this am showed c/f appendicitis. Patient reports pain started over the weekend as gas-like pain. It was mainly periumbilical but over the next few days migrated to RLQ. Patient denies subjective fever/chills, CP, SOB, or n/v. Pain currently  well controlled. Last colonoscopy was in March which she reports was normal.    Allergies    penicillin (Anaphylaxis)  Cipro (Hives; Rash)  doxycycline (Anaphylaxis)    Intolerances    	    MEDICATIONS:  heparin   Injectable 5000 Unit(s) SubCutaneous every 8 hours    clindamycin IVPB 600 milliGRAM(s) IV Intermittent every 8 hours  clindamycin IVPB      metroNIDAZOLE  IVPB 500 milliGRAM(s) IV Intermittent every 12 hours    diphenhydrAMINE Injectable 50 milliGRAM(s) IV Push once    acetaminophen   IVPB .. 1000 milliGRAM(s) IV Intermittent every 6 hours        lactated ringers. 1000 milliLiter(s) IV Continuous <Continuous>      PAST MEDICAL & SURGICAL HISTORY:  Rheumatoid arthritis      Chronic GERD      HTN (hypertension)          FAMILY HISTORY:      SOCIAL HISTORY:    non smoker. indep in adl      REVIEW OF SYSTEMS:  See HPI, otherwise complete 10 point review of systems negative    [ ] All others negative	      PHYSICAL EXAM:  T(C): 36.7 (10-05-24 @ 06:00), Max: 36.8 (10-04-24 @ 11:04)  HR: 84 (10-05-24 @ 06:00) (73 - 95)  BP: 152/77 (10-05-24 @ 06:00) (99/87 - 166/86)  RR: 18 (10-05-24 @ 06:00) (13 - 20)  SpO2: 99% (10-05-24 @ 06:00) (94% - 100%)  Wt(kg): --  I&O's Summary    04 Oct 2024 07:01  -  05 Oct 2024 07:00  --------------------------------------------------------  IN: 1060 mL / OUT: 1055 mL / NET: 5 mL    05 Oct 2024 07:01  -  05 Oct 2024 08:41  --------------------------------------------------------  IN: 0 mL / OUT: 40 mL / NET: -40 mL        Appearance: No Acute Distress	  HEENT:  Normal oral mucosa, PERRL, EOMI	  Cardiovascular: Normal S1 S2, No JVD, No murmurs/rubs/gallops  Respiratory: Lungs clear to auscultation bilaterally  Gastrointestinal:  Soft, Non-tender, + BS	  Skin: No rashes, No ecchymoses, No cyanosis	  Neurologic: Non-focal  Extremities: No clubbing, cyanosis or edema  Vascular: Peripheral pulses palpable 2+ bilaterally  Psychiatry: A & O x 3, Mood & affect appropriate    Laboratory Data:	 	    CBC Full  -  ( 04 Oct 2024 12:46 )  WBC Count : 8.18 K/uL  Hemoglobin : 11.1 g/dL  Hematocrit : 35.2 %  Platelet Count - Automated : 258 K/uL  Mean Cell Volume : 58.5 fL  Mean Cell Hemoglobin : 18.4 pg  Mean Cell Hemoglobin Concentration : 31.5 gm/dL  Auto Neutrophil # : 5.55 K/uL  Auto Lymphocyte # : 1.35 K/uL  Auto Monocyte # : 0.29 K/uL  Auto Eosinophil # : 0.21 K/uL  Auto Basophil # : 0.00 K/uL  Auto Neutrophil % : 66.9 %  Auto Lymphocyte % : 16.5 %  Auto Monocyte % : 3.5 %  Auto Eosinophil % : 2.6 %  Auto Basophil % : 0.0 %    10-04    136  |  98  |  11  ----------------------------<  99  3.7   |  25  |  0.76    Ca    9.4      04 Oct 2024 12:46    TPro  7.3  /  Alb  4.3  /  TBili  0.6  /  DBili  x   /  AST  15  /  ALT  16  /  AlkPhos  70  10-04      proBNP:   Lipid Profile:   HgA1c:   TSH:       CARDIAC MARKERS:            Interpretation of Telemetry: 	    ECG:  	  RADIOLOGY:  OTHER: 	    PREVIOUS DIAGNOSTIC TESTING:    [ ] Echocardiogram:  [ ] Catheterization:  [ ] Stress Test:  	    Assessment:  64F PMHx RA (hydroxycholorquinolone), myomectomy (firborids), HTN presenting after OP CT scan this am showed c/f appendicitis    Recs:  cardiac stable  no postop cardiac events noted  s/p appendectomy  care per surgery  monitor cruzito drain output  abx per surgery/allergy to cipro noted  pain control  adv diet and ambulate as able  dvt ppx          Advanced care planning forms were discussed. Code status including forceful chest compressions, defibrillation and intubation were discussed. The risks benefits and alternatives to pertinent cardiac procedures and interventions were discussed in detail and all questions were answered. Duration: 15 minutes.  Greater than 90 minutes spent on total encounter; more than 50% of the visit was spent counseling and/or coordinating care by the attending physician.   	  Gordon Luke MD   Cardiovascular Diseases  [phone]

## 2024-10-05 NOTE — DISCHARGE NOTE PROVIDER - NSDCMRMEDTOKEN_GEN_ALL_CORE_FT
hydroxychloroquine: once a day  losartan 100 mg oral tablet: 1 tab(s) orally once a day  metroNIDAZOLE 500 mg oral tablet: 1 tab(s) orally every 8 hours  NIFEdipine 30 mg oral tablet, extended release: 1 tab(s) orally once a day  omeprazole 40 mg oral delayed release capsule: 1 cap(s) orally once a day  oxyCODONE 5 mg oral tablet: 1 tab(s) orally every 4 hours as needed for  severe pain MDD: 6

## 2024-10-05 NOTE — PROGRESS NOTE ADULT - ASSESSMENT
64F s/p lap appy    Reg diet today  AM labs  DC plan for today with HUGO drain on 4d of levaquin/flagyl  Fu in office Tues/Thurs 64F PMHx RA (hydroxycholorquinolone), myomectomy (firborids), HTN presenting after OP CT scan this am showed c/f appendicitis. Patient reports pain started over the weekend as gas-like pain. It was mainly periumbilical but over the next few days migrated to RLQ. Patient denies subjective fever/chills, CP, SOB, or n/v. Pain currently  well controlled. Last colonoscopy was in March which she reports was normal. Now s/p Lap appendectomy.     Plan:  - Diet; Regular   - Monitor bowel function   - Antibiotics  - Analgesia and antiemetics as needed  - Strict I&O's  - Monitor HUGO drain output- Needs HUGO drain teaching  - OOB as tolerated  - Incentive spirometry  - DVT prophylaxis: SCD  - Dispo: DC plan for today with HUGO drain on 4d of levaquin/flagyl    A team  [id number]

## 2024-10-05 NOTE — DISCHARGE NOTE PROVIDER - HOSPITAL COURSE
64F PMHx RA (hydroxycholorquinolone), myomectomy (firborids), HTN presenting after OP CT scan showed c/f appendicitis.  Day 0: Patient was admitted under Dr. Fiore, added on and consented for laparoscopic appendicectomy, patient kept NPO/IVF/IV zosyn and pain control. Uneventful surgery without complication, drain placed, POC unremarkable, normal WBC, H/H 11.1/35.2, electrolytes within normal limits. tolerating clears   POD 1: patient recovering well, tolerating regular diet, pain under control, remained clinically stable, will be discharge home with drains and follow up appointment. Patient received Drain management education.

## 2024-10-06 VITALS
DIASTOLIC BLOOD PRESSURE: 86 MMHG | SYSTOLIC BLOOD PRESSURE: 164 MMHG | TEMPERATURE: 98 F | RESPIRATION RATE: 18 BRPM | HEART RATE: 79 BPM | OXYGEN SATURATION: 99 %

## 2024-10-06 LAB
ANION GAP SERPL CALC-SCNC: 10 MMOL/L — SIGNIFICANT CHANGE UP (ref 7–14)
BUN SERPL-MCNC: 12 MG/DL — SIGNIFICANT CHANGE UP (ref 7–23)
CALCIUM SERPL-MCNC: 8.8 MG/DL — SIGNIFICANT CHANGE UP (ref 8.4–10.5)
CHLORIDE SERPL-SCNC: 102 MMOL/L — SIGNIFICANT CHANGE UP (ref 98–107)
CO2 SERPL-SCNC: 26 MMOL/L — SIGNIFICANT CHANGE UP (ref 22–31)
CREAT SERPL-MCNC: 0.83 MG/DL — SIGNIFICANT CHANGE UP (ref 0.5–1.3)
EGFR: 78 ML/MIN/1.73M2 — SIGNIFICANT CHANGE UP
GLUCOSE SERPL-MCNC: 94 MG/DL — SIGNIFICANT CHANGE UP (ref 70–99)
HCT VFR BLD CALC: 31.1 % — LOW (ref 34.5–45)
HGB BLD-MCNC: 9.9 G/DL — LOW (ref 11.5–15.5)
MAGNESIUM SERPL-MCNC: 1.8 MG/DL — SIGNIFICANT CHANGE UP (ref 1.6–2.6)
MCHC RBC-ENTMCNC: 18.3 PG — LOW (ref 27–34)
MCHC RBC-ENTMCNC: 31.8 GM/DL — LOW (ref 32–36)
MCV RBC AUTO: 57.4 FL — LOW (ref 80–100)
NRBC # BLD: 0 /100 WBCS — SIGNIFICANT CHANGE UP (ref 0–0)
NRBC # FLD: 0 K/UL — SIGNIFICANT CHANGE UP (ref 0–0)
PHOSPHATE SERPL-MCNC: 3.4 MG/DL — SIGNIFICANT CHANGE UP (ref 2.5–4.5)
PLATELET # BLD AUTO: 224 K/UL — SIGNIFICANT CHANGE UP (ref 150–400)
POTASSIUM SERPL-MCNC: 3.7 MMOL/L — SIGNIFICANT CHANGE UP (ref 3.5–5.3)
POTASSIUM SERPL-SCNC: 3.7 MMOL/L — SIGNIFICANT CHANGE UP (ref 3.5–5.3)
RBC # BLD: 5.42 M/UL — HIGH (ref 3.8–5.2)
RBC # FLD: 15.9 % — HIGH (ref 10.3–14.5)
SODIUM SERPL-SCNC: 138 MMOL/L — SIGNIFICANT CHANGE UP (ref 135–145)
WBC # BLD: 7.7 K/UL — SIGNIFICANT CHANGE UP (ref 3.8–10.5)
WBC # FLD AUTO: 7.7 K/UL — SIGNIFICANT CHANGE UP (ref 3.8–10.5)

## 2024-10-06 RX ORDER — METOCLOPRAMIDE HCL 5 MG
10 TABLET ORAL ONCE
Refills: 0 | Status: COMPLETED | OUTPATIENT
Start: 2024-10-06 | End: 2024-10-06

## 2024-10-06 RX ORDER — LOSARTAN POTASSIUM 100 MG/1
100 TABLET, FILM COATED ORAL DAILY
Refills: 0 | Status: DISCONTINUED | OUTPATIENT
Start: 2024-10-06 | End: 2024-10-06

## 2024-10-06 RX ORDER — ACETAMINOPHEN 325 MG
3 TABLET ORAL
Qty: 0 | Refills: 0 | DISCHARGE
Start: 2024-10-06

## 2024-10-06 RX ORDER — ONDANSETRON HCL/PF 4 MG/2 ML
4 VIAL (ML) INJECTION ONCE
Refills: 0 | Status: COMPLETED | OUTPATIENT
Start: 2024-10-06 | End: 2024-10-06

## 2024-10-06 RX ORDER — PANTOPRAZOLE SODIUM 40 MG/1
40 TABLET, DELAYED RELEASE ORAL
Refills: 0 | Status: DISCONTINUED | OUTPATIENT
Start: 2024-10-06 | End: 2024-10-06

## 2024-10-06 RX ORDER — METOCLOPRAMIDE HCL 5 MG
1 TABLET ORAL
Qty: 9 | Refills: 0
Start: 2024-10-06 | End: 2024-10-08

## 2024-10-06 RX ADMIN — Medication 975 MILLIGRAM(S): at 00:43

## 2024-10-06 RX ADMIN — Medication 975 MILLIGRAM(S): at 06:08

## 2024-10-06 RX ADMIN — OXYCODONE HYDROCHLORIDE 5 MILLIGRAM(S): 30 TABLET, FILM COATED, EXTENDED RELEASE ORAL at 05:20

## 2024-10-06 RX ADMIN — Medication 500 MILLIGRAM(S): at 05:08

## 2024-10-06 RX ADMIN — LOSARTAN POTASSIUM 100 MILLIGRAM(S): 100 TABLET, FILM COATED ORAL at 14:55

## 2024-10-06 RX ADMIN — Medication 10 MILLIGRAM(S): at 12:45

## 2024-10-06 RX ADMIN — Medication 5000 UNIT(S): at 05:08

## 2024-10-06 RX ADMIN — Medication 30 MILLIGRAM(S): at 14:55

## 2024-10-06 RX ADMIN — Medication 975 MILLIGRAM(S): at 05:08

## 2024-10-06 RX ADMIN — OXYCODONE HYDROCHLORIDE 5 MILLIGRAM(S): 30 TABLET, FILM COATED, EXTENDED RELEASE ORAL at 04:20

## 2024-10-06 RX ADMIN — PANTOPRAZOLE SODIUM 40 MILLIGRAM(S): 40 TABLET, DELAYED RELEASE ORAL at 14:55

## 2024-10-06 RX ADMIN — Medication 5000 UNIT(S): at 14:55

## 2024-10-06 RX ADMIN — Medication 500 MILLIGRAM(S): at 16:37

## 2024-10-06 RX ADMIN — Medication 4 MILLIGRAM(S): at 09:45

## 2024-10-06 NOTE — PROGRESS NOTE ADULT - SUBJECTIVE AND OBJECTIVE BOX
Surgery Daily Progress Note  =====================================================  SUBJECTIVE: A 64y Female Patient seen and examined at bedside on AM rounds. Afebrile, HD stable on RA. Patient denies abdominal pain, nausea or vomiting. Passing flatus and tolerating diet.    ALLERGIES:  penicillin (Anaphylaxis)  Cipro (Hives; Rash)  doxycycline (Anaphylaxis)      --------------------------------------------------------------------------------------    MEDICATIONS:    Neurologic Medications  acetaminophen     Tablet .. 975 milliGRAM(s) Oral every 6 hours  ibuprofen  Tablet. 600 milliGRAM(s) Oral every 6 hours  oxyCODONE    IR 2.5 milliGRAM(s) Oral every 4 hours PRN Moderate Pain (4 - 6)  oxyCODONE    IR 5 milliGRAM(s) Oral every 4 hours PRN Severe Pain (7 - 10)    Respiratory Medications  diphenhydrAMINE Injectable 50 milliGRAM(s) IV Push once    Cardiovascular Medications    Gastrointestinal Medications  simethicone 80 milliGRAM(s) Chew two times a day PRN Gas    Genitourinary Medications    Hematologic/Oncologic Medications  heparin   Injectable 5000 Unit(s) SubCutaneous every 8 hours    Antimicrobial/Immunologic Medications  levoFLOXacin  Tablet 750 milliGRAM(s) Oral every 24 hours  metroNIDAZOLE    Tablet 500 milliGRAM(s) Oral every 8 hours    Endocrine/Metabolic Medications    Topical/Other Medications    --------------------------------------------------------------------------------------    VITAL SIGNS:  penicillin (Anaphylaxis)  Cipro (Hives; Rash)  doxycycline (Anaphylaxis)      T(C): 36.7 (10-06-24 @ 05:08), Max: 37.1 (10-05-24 @ 21:00)  HR: 71 (10-06-24 @ 05:08) (67 - 82)  BP: 131/66 (10-06-24 @ 05:08) (125/61 - 160/66)  RR: 14 (10-06-24 @ 05:08) (14 - 18)  SpO2: 96% (10-06-24 @ 05:08) (95% - 100%)  --------------------------------------------------------------------------------------    EXAM    General: NAD, resting in bed comfortably.  Cardiac: regular rate, warm and well perfused  Respiratory: Nonlabored respirations, normal cw expansion.  Abdomen: soft, nontender, nondistended. incision is c/d/i. HUGO drain has serous output   Extremities: normal strength, FROM, no deformities  --------------------------------------------------------------------------------------    I&Os  T(C): 36.7 (10-06-24 @ 05:08), Max: 37.1 (10-05-24 @ 21:00)  HR: 71 (10-06-24 @ 05:08) (67 - 82)  BP: 131/66 (10-06-24 @ 05:08) (125/61 - 160/66)  RR: 14 (10-06-24 @ 05:08) (14 - 18)  SpO2: 96% (10-06-24 @ 05:08) (95% - 100%)  --------------------------------------------------------------------------------------    LABS                          9.9    7.70  )-----------( 224      ( 06 Oct 2024 04:29 )             31.1     10-06    138  |  102  |  12  ----------------------------<  94  3.7   |  26  |  0.83    Ca    8.8      06 Oct 2024 04:29  Phos  3.4     10-06  Mg     1.80     10-06    TPro  7.3  /  Alb  4.3  /  TBili  0.6  /  DBili  x   /  AST  15  /  ALT  16  /  AlkPhos  70  10-04    PT/INR - ( 04 Oct 2024 12:46 )   PT: 11.6 sec;   INR: 1.00 ratio         PTT - ( 04 Oct 2024 12:46 )  PTT:32.8 sec  Urinalysis Basic - ( 06 Oct 2024 04:29 )    Color: x / Appearance: x / SG: x / pH: x  Gluc: 94 mg/dL / Ketone: x  / Bili: x / Urobili: x   Blood: x / Protein: x / Nitrite: x   Leuk Esterase: x / RBC: x / WBC x   Sq Epi: x / Non Sq Epi: x / Bacteria: x        10-05-24 @ 07:01  -  10-06-24 @ 07:00  --------------------------------------------------------  IN: 600 mL / OUT: 3522.5 mL / NET: -2922.5 mL      acetaminophen     Tablet .. 975 milliGRAM(s) Oral every 6 hours  diphenhydrAMINE Injectable 50 milliGRAM(s) IV Push once  heparin   Injectable 5000 Unit(s) SubCutaneous every 8 hours  ibuprofen  Tablet. 600 milliGRAM(s) Oral every 6 hours  levoFLOXacin  Tablet 750 milliGRAM(s) Oral every 24 hours  metroNIDAZOLE    Tablet 500 milliGRAM(s) Oral every 8 hours  oxyCODONE    IR 5 milliGRAM(s) Oral every 4 hours PRN  oxyCODONE    IR 2.5 milliGRAM(s) Oral every 4 hours PRN  simethicone 80 milliGRAM(s) Chew two times a day PRN      RADIOLOGY, EKG & ADDITIONAL TESTS: Reviewed.

## 2024-10-06 NOTE — PROGRESS NOTE ADULT - ASSESSMENT
64F PMHx RA (hydroxycholorquinolone), myomectomy (firborids), HTN presenting after OP CT scan this am showed c/f appendicitis. Patient reports pain started over the weekend as gas-like pain. It was mainly periumbilical but over the next few days migrated to RLQ. Patient denies subjective fever/chills, CP, SOB, or n/v. Pain currently  well controlled. Last colonoscopy was in March which she reports was normal. Now s/p Lap appendectomy.     Plan:  - Diet; Regular   - Monitor bowel function   - Antibiotics  - Strict I&O's  - Monitor HUGO drain output- Needs HUGO drain teaching  - OOB as tolerated  - Incentive spirometry  - DVT prophylaxis: SCD  - Dispo: DC plan for today with HUGO drain on 3d of levaquin/flagyl    A team  [phone]

## 2024-10-06 NOTE — PROGRESS NOTE ADULT - ATTENDING COMMENTS
DATE OF SERVICE: 10-06-24 @ 11:00    Mild nausea this am with some spit up  labs appropriate, HUGO SS 170cc  Abd soft nt/nd  Cont reg diet, poss DC home this afternoon if doing well

## 2024-10-10 LAB — SURGICAL PATHOLOGY STUDY: SIGNIFICANT CHANGE UP

## 2025-08-22 ENCOUNTER — APPOINTMENT (OUTPATIENT)
Dept: UROLOGY | Facility: CLINIC | Age: 65
End: 2025-08-22

## (undated) DEVICE — DRAIN RESERVOIR FOR JACKSON PRATT 100CC CARDINAL

## (undated) DEVICE — TROCAR COVIDIEN VERSAONE BLADED FIXATION 11MM STANDARD

## (undated) DEVICE — VENODYNE/SCD SLEEVE CALF MEDIUM

## (undated) DEVICE — DRSG STERISTRIPS 0.5 X 4"

## (undated) DEVICE — PROTECTOR HEEL / ELBOW FLUFFY

## (undated) DEVICE — BASIN SET SINGLE

## (undated) DEVICE — SOL IRR POUR NS 0.9% 1000ML

## (undated) DEVICE — ELCTR REM POLYHESIVE PATIENT RETURN ELECTRODE ADULT

## (undated) DEVICE — LIGASURE BLUNT TIP 37CM

## (undated) DEVICE — SUT MONOCRYL 4-0 27" PS-2 UNDYED

## (undated) DEVICE — BLADE SURGICAL #15 CARBON

## (undated) DEVICE — GOWN LG

## (undated) DEVICE — Device

## (undated) DEVICE — SUT VICRYL 0 18" ENDOLOOP LIGATURE

## (undated) DEVICE — ENDOCATCH 10MM SPECIMEN POUCH

## (undated) DEVICE — D HELP - CLEARVIEW CLEARIFY SYSTEM

## (undated) DEVICE — DRAPE 3/4 SHEET 52X76"

## (undated) DEVICE — SYR LUER LOK 20CC

## (undated) DEVICE — DISSECTOR ENDOSCOPIC KITTNER SINGLE TIP

## (undated) DEVICE — TROCAR COVIDIEN VERSAPORT BLADELESS OPTICAL 5MM STANDARD

## (undated) DEVICE — POSITIONER STRAP ARMBOARD VELCRO TS-30

## (undated) DEVICE — GLV 7.5 PROTEXIS (WHITE)

## (undated) DEVICE — TIP METZENBAUM SCISSOR MONOPOLAR ENDOCUT (ORANGE)

## (undated) DEVICE — WARMING BLANKET UPPER ADULT

## (undated) DEVICE — STAPLER COVIDIEN ENDO GIA STANDARD HANDLE

## (undated) DEVICE — TUBING STRYKEFLOW II SUCTION / IRRIGATOR

## (undated) DEVICE — SUT VICRYL 0 27" UR-6

## (undated) DEVICE — TUBING HYDRO-SURG PLUS IRRIGATOR W SMOKEVAC & PROBE

## (undated) DEVICE — SOL IRR POUR H2O 500ML

## (undated) DEVICE — PACK GENERAL LAPAROSCOPY

## (undated) DEVICE — TROCAR APPLIED MEDICAL KII BALLOON BLUNT TIP 12MM X 130MM

## (undated) DEVICE — ELCTR BOVIE PENCIL SMOKE EVACUATION

## (undated) DEVICE — TROCAR COVIDIEN VERSAONE BLUNT TIP HASSAN 12MM

## (undated) DEVICE — TUBING STRYKER PNEUMOCLEAR SMOKE EVACUATION HIGH FLOW